# Patient Record
(demographics unavailable — no encounter records)

---

## 2025-01-27 NOTE — REVIEW OF SYSTEMS
[Fatigue] : fatigue [Chest Pain] : chest pain [Joint Pain] : joint pain [Muscle Pain] : muscle pain [Easy Bruising] : a tendency for easy bruising [Negative] : Allergic/Immunologic [FreeTextEntry5] : from working out [FreeTextEntry9] : chronic arthritis

## 2025-01-27 NOTE — HISTORY OF PRESENT ILLNESS
[de-identified] : 85 year old male presents today for initial consultation of anemia and splenomegaly, referred by Dr. Shah.  Labs dated 2022, show a Hgb 11.8, HCT 37.9, MCV 94.3, platelets 93.  In September his Hgb was 10.7, no iron stores on chart.  He is on oral iron every other day.  According to chart notes he sporadically adherent to oral iron supplementation.  He was  under the care of Dr. Rosado at [facility] for liver disease> cirrhosis with portal HTN.  It was also noted he has increasing splenomegaly.  Abdominal US 2022- Cirrhotic liver. No sonographic evidence of HCC. Recanalized umbilical vein and splenomegaly consistent with portal hypertension. Note that the spleen now measures 17.1 cm, previously 14 cm.\par  \par  Patient was previously under the care of Dr. Anthony at 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  \par  \par  2021- CT abd and pelvis - No significant change in cirrhotic configuration of the liver and evidence for portal hypertension.  No focal hepatic mass. No significant change in appearance of a minimally thickened appendix measuring up to 7 mm, without periappendiceal inflammation.\par  \par  Family Hx-\par  Father  from prostate cancer in his 80's\par  Mother  from breast cancer in her 70's\par  \par  Social Hx-\par  Former smoker- smoked x 15 yrs  x 1 PPD \par  No ETOH use\par  Retired- Owns construction company  \par   [de-identified] : Patient is here for follow up for anemia and thrombocytopenia. He offers no complaints.

## 2025-01-27 NOTE — ASSESSMENT
[Patient/Caregiver unclear of wishes] : Patient/Caregiver unclear of wishes [Relationship: ___] : Relationship: [unfilled] [FreeTextEntry1] : 87-year male with diagnosis of cryptogenic liver cirrhosis referred for evaluation of anemia and thrombocytopenia. Patient denies blood loss.  # Anemia of iron deficiency - portal gastropathy, on PI. Haptoglobin low secondary to liver cirrhosis. Patient takes oral iron - c/o constipation, stopped. Check ferritin before offering IV iron Hemoglobin declined to 10.7, patient fatigued. Schedule IV iron - injectafer x 2  # Splenomegaly - check flow cytometry, likely secondary to liver cirrhosis and portal hypertension.   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  - Patient evaluated at [facility] [place] - not a candidate for sx b/o liver cirrhosis, age comorbidities, under surveillance only   # Thrombocytopenia- clumping and hypersplenism  # Biliary cirrhosis    #  Patient with PPM x 4 years, walks with cane.   case and mgmt discussed with Dr. Marcus, return in 3 months- reg labs- mm panel and irons  [AdvancecareDate] : 03/26/2024

## 2025-01-27 NOTE — ASSESSMENT
[Patient/Caregiver unclear of wishes] : Patient/Caregiver unclear of wishes [Relationship: ___] : Relationship: [unfilled] [FreeTextEntry1] : 87-year male with diagnosis of cryptogenic liver cirrhosis referred for evaluation of anemia and thrombocytopenia. Patient denies blood loss.  # Anemia of iron deficiency - portal gastropathy, on PI. Haptoglobin low secondary to liver cirrhosis. Patient takes oral iron - c/o constipation, stopped. Check ferritin before offering IV iron Hemoglobin declined to 10.7, patient fatigued. Schedule IV iron - injectafer x 2  # Splenomegaly - check flow cytometry, likely secondary to liver cirrhosis and portal hypertension.   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  - Patient evaluated at [facility] [place] - not a candidate for sx b/o liver cirrhosis, age comorbidities, under surveillance only   # Thrombocytopenia- clumping and hypersplenism  # Biliary cirrhosis    #  Patient with PPM x 4 years, walks with cane.   case and mgmt discussed with Dr. Marcus, return in 3 months- reg labs- mm panel and irons  [AdvancecareDate] : 03/26/2024

## 2025-01-27 NOTE — HISTORY OF PRESENT ILLNESS
[de-identified] : 85 year old male presents today for initial consultation of anemia and splenomegaly, referred by Dr. Shah.  Labs dated 2022, show a Hgb 11.8, HCT 37.9, MCV 94.3, platelets 93.  In September his Hgb was 10.7, no iron stores on chart.  He is on oral iron every other day.  According to chart notes he sporadically adherent to oral iron supplementation.  He was  under the care of Dr. Rosado at [facility] for liver disease> cirrhosis with portal HTN.  It was also noted he has increasing splenomegaly.  Abdominal US 2022- Cirrhotic liver. No sonographic evidence of HCC. Recanalized umbilical vein and splenomegaly consistent with portal hypertension. Note that the spleen now measures 17.1 cm, previously 14 cm.\par  \par  Patient was previously under the care of Dr. 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  \par  \par  2021- CT abd and pelvis - No significant change in cirrhotic configuration of the liver and evidence for portal hypertension.  No focal hepatic mass. No significant change in appearance of a minimally thickened appendix measuring up to 7 mm, without periappendiceal inflammation.\par  \par  Family Hx-\par  Father  from prostate cancer in his 80's\par  Mother  from breast cancer in her 70's\par  \par  Social Hx-\par  Former smoker- smoked x 15 yrs  x 1 PPD \par  No ETOH use\par  Retired- Owns construction company  \par   [de-identified] : Patient is here for follow up for anemia and thrombocytopenia. He offers no complaints.

## 2025-06-11 NOTE — ASSESSMENT
[FreeTextEntry1] : 87-year male with diagnosis of cryptogenic liver cirrhosis referred for evaluation of anemia and thrombocytopenia. Patient denies blood loss.  # Anemia of iron deficiency - portal gastropathy, on PI. Haptoglobin low secondary to liver cirrhosis. Patient takes oral iron - c/o constipation, stopped. Check ferritin before offering IV iron Hemoglobin declined to 10.7, patient fatigued. Schedule IV iron - injectafer x 2  # Splenomegaly - check flow cytometry, likely secondary to liver cirrhosis and portal hypertension.   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  - Patient evaluated at [facility] [place] - not a candidate for sx b/o liver cirrhosis, age comorbidities, under surveillance only   # Thrombocytopenia- clumping and hypersplenism  # Biliary cirrhosis    #  Patient with PPM x 4 years, walks with cane.   case and mgmt discussed with Dr. Marcus, return in 3 months- reg labs- mm panel and irons  [AdvancecareDate] : 03/26/2024 [Patient/Caregiver unclear of wishes] : Patient/Caregiver unclear of wishes [Relationship: ___] : Relationship: [unfilled]

## 2025-06-11 NOTE — HISTORY OF PRESENT ILLNESS
[de-identified] : 85 year old male presents today for initial consultation of anemia and splenomegaly, referred by Dr. Shah.  Labs dated 2022, show a Hgb 11.8, HCT 37.9, MCV 94.3, platelets 93.  In September his Hgb was 10.7, no iron stores on chart.  He is on oral iron every other day.  According to chart notes he sporadically adherent to oral iron supplementation.  He was  under the care of Dr. Rosado at [facility] for liver disease> cirrhosis with portal HTN.  It was also noted he has increasing splenomegaly.  Abdominal US 2022- Cirrhotic liver. No sonographic evidence of HCC. Recanalized umbilical vein and splenomegaly consistent with portal hypertension. Note that the spleen now measures 17.1 cm, previously 14 cm.\par  \par  Patient was previously under the care of Dr. 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  \par  \par  2021- CT abd and pelvis - No significant change in cirrhotic configuration of the liver and evidence for portal hypertension.  No focal hepatic mass. No significant change in appearance of a minimally thickened appendix measuring up to 7 mm, without periappendiceal inflammation.\par  \par  Family Hx-\par  Father  from prostate cancer in his 80's\par  Mother  from breast cancer in her 70's\par  \par  Social Hx-\par  Former smoker- smoked x 15 yrs  x 1 PPD \par  No ETOH use\par  Retired- Owns construction company  \par   [de-identified] : Patient is here for follow up for anemia and thrombocytopenia. He offers no complaints.